# Patient Record
Sex: FEMALE | Race: WHITE | NOT HISPANIC OR LATINO
[De-identification: names, ages, dates, MRNs, and addresses within clinical notes are randomized per-mention and may not be internally consistent; named-entity substitution may affect disease eponyms.]

---

## 2021-01-03 ENCOUNTER — APPOINTMENT (OUTPATIENT)
Dept: DISASTER EMERGENCY | Facility: CLINIC | Age: 26
End: 2021-01-03

## 2021-01-03 DIAGNOSIS — Z01.818 ENCOUNTER FOR OTHER PREPROCEDURAL EXAMINATION: ICD-10-CM

## 2021-01-05 LAB — SARS-COV-2 N GENE NPH QL NAA+PROBE: NOT DETECTED

## 2021-01-05 RX ORDER — ONDANSETRON 8 MG/1
4 TABLET, FILM COATED ORAL ONCE
Refills: 0 | Status: DISCONTINUED | OUTPATIENT
Start: 2021-01-06 | End: 2021-01-07

## 2021-01-05 RX ORDER — MEPERIDINE HYDROCHLORIDE 50 MG/ML
12.5 INJECTION INTRAMUSCULAR; INTRAVENOUS; SUBCUTANEOUS
Refills: 0 | Status: DISCONTINUED | OUTPATIENT
Start: 2021-01-06 | End: 2021-01-07

## 2021-01-05 RX ORDER — FENTANYL CITRATE 50 UG/ML
50 INJECTION INTRAVENOUS
Refills: 0 | Status: DISCONTINUED | OUTPATIENT
Start: 2021-01-06 | End: 2021-01-07

## 2021-01-05 RX ORDER — SODIUM CHLORIDE 9 MG/ML
1000 INJECTION, SOLUTION INTRAVENOUS
Refills: 0 | Status: DISCONTINUED | OUTPATIENT
Start: 2021-01-06 | End: 2021-01-07

## 2021-01-05 RX ORDER — FAMOTIDINE 10 MG/ML
20 INJECTION INTRAVENOUS ONCE
Refills: 0 | Status: DISCONTINUED | OUTPATIENT
Start: 2021-01-06 | End: 2021-01-07

## 2021-01-05 RX ORDER — HYDROMORPHONE HYDROCHLORIDE 2 MG/ML
0.5 INJECTION INTRAMUSCULAR; INTRAVENOUS; SUBCUTANEOUS
Refills: 0 | Status: DISCONTINUED | OUTPATIENT
Start: 2021-01-06 | End: 2021-01-07

## 2021-01-05 RX ORDER — SODIUM CHLORIDE 9 MG/ML
3 INJECTION INTRAMUSCULAR; INTRAVENOUS; SUBCUTANEOUS EVERY 8 HOURS
Refills: 0 | Status: DISCONTINUED | OUTPATIENT
Start: 2021-01-06 | End: 2021-01-07

## 2021-01-06 ENCOUNTER — OUTPATIENT (OUTPATIENT)
Dept: INPATIENT UNIT | Facility: HOSPITAL | Age: 26
LOS: 1 days | Discharge: HOME | End: 2021-01-06
Payer: COMMERCIAL

## 2021-01-06 ENCOUNTER — RESULT REVIEW (OUTPATIENT)
Age: 26
End: 2021-01-06

## 2021-01-06 VITALS
RESPIRATION RATE: 16 BRPM | OXYGEN SATURATION: 100 % | DIASTOLIC BLOOD PRESSURE: 90 MMHG | SYSTOLIC BLOOD PRESSURE: 137 MMHG | WEIGHT: 143.96 LBS | HEIGHT: 64 IN | HEART RATE: 100 BPM | TEMPERATURE: 98 F

## 2021-01-06 VITALS
DIASTOLIC BLOOD PRESSURE: 79 MMHG | RESPIRATION RATE: 16 BRPM | TEMPERATURE: 98 F | SYSTOLIC BLOOD PRESSURE: 131 MMHG | OXYGEN SATURATION: 100 % | HEART RATE: 88 BPM

## 2021-01-06 DIAGNOSIS — Z98.82 BREAST IMPLANT STATUS: Chronic | ICD-10-CM

## 2021-01-06 DIAGNOSIS — J35.01 CHRONIC TONSILLITIS: ICD-10-CM

## 2021-01-06 LAB — HCG UR QL: NEGATIVE — SIGNIFICANT CHANGE UP

## 2021-01-06 PROCEDURE — 88304 TISSUE EXAM BY PATHOLOGIST: CPT | Mod: 26

## 2021-01-06 PROCEDURE — 88304 TISSUE EXAM BY PATHOLOGIST: CPT

## 2021-01-06 PROCEDURE — 81025 URINE PREGNANCY TEST: CPT

## 2021-01-06 RX ORDER — OXYCODONE HYDROCHLORIDE 5 MG/1
10 TABLET ORAL ONCE
Refills: 0 | Status: DISCONTINUED | OUTPATIENT
Start: 2021-01-06 | End: 2021-01-06

## 2021-01-06 RX ORDER — NORETHINDRONE AND ETHINYL ESTRADIOL 0.4-0.035
1 KIT ORAL
Qty: 0 | Refills: 0 | DISCHARGE

## 2021-01-06 RX ADMIN — OXYCODONE HYDROCHLORIDE 10 MILLIGRAM(S): 5 TABLET ORAL at 12:11

## 2021-01-06 RX ADMIN — FENTANYL CITRATE 50 MICROGRAM(S): 50 INJECTION INTRAVENOUS at 11:58

## 2021-01-06 NOTE — ASU DISCHARGE PLAN (ADULT/PEDIATRIC) - CARE PROVIDER_API CALL
Zen Gill)  Otolaryngology  [address] [place] [address]  Phone: [phone]  Fax: [phone]  Follow Up Time:

## 2021-01-06 NOTE — ASU DISCHARGE PLAN (ADULT/PEDIATRIC) - NURSING INSTRUCTIONS
see other sheet; follow up 2 weeks see other sheet; follow up 2 weeks Begin with liquids and light food ( tea, toast, Jello, soups). Advance to what you normally eat. Liquids should taken in adequate amounts today.     CALL the DOCTOR:    -Fever greater than  101F  - Signs  of infection such as : increase pain,swelling,redness,or a bad  smell coming from the wound.  -Excessive amount of bleeding.  - Any pain that appears to be getting worse.  - Vomiting  -  If you have  not urinated 8 hours after surgery or have any difficulty urinating.     A responsible adult should be with you for the rest of the day and night for your safety and to help you if you needed.    Review attached FACT SHEET if applicable.

## 2021-01-11 DIAGNOSIS — Z98.82 BREAST IMPLANT STATUS: ICD-10-CM

## 2021-01-11 DIAGNOSIS — J32.9 CHRONIC SINUSITIS, UNSPECIFIED: ICD-10-CM

## 2021-01-11 DIAGNOSIS — J35.01 CHRONIC TONSILLITIS: ICD-10-CM

## 2021-01-29 PROBLEM — J32.9 CHRONIC SINUSITIS, UNSPECIFIED: Chronic | Status: ACTIVE | Noted: 2021-01-05

## 2021-02-02 ENCOUNTER — APPOINTMENT (OUTPATIENT)
Dept: CARDIOLOGY | Facility: CLINIC | Age: 26
End: 2021-02-02
Payer: COMMERCIAL

## 2021-02-02 ENCOUNTER — NON-APPOINTMENT (OUTPATIENT)
Age: 26
End: 2021-02-02

## 2021-02-02 VITALS
DIASTOLIC BLOOD PRESSURE: 90 MMHG | TEMPERATURE: 98 F | WEIGHT: 144 LBS | SYSTOLIC BLOOD PRESSURE: 158 MMHG | OXYGEN SATURATION: 100 % | HEART RATE: 125 BPM

## 2021-02-02 PROCEDURE — 99072 ADDL SUPL MATRL&STAF TM PHE: CPT

## 2021-02-02 PROCEDURE — 99204 OFFICE O/P NEW MOD 45 MIN: CPT

## 2021-02-02 PROCEDURE — 93000 ELECTROCARDIOGRAM COMPLETE: CPT

## 2021-02-02 PROCEDURE — 93242 EXT ECG>48HR<7D RECORDING: CPT

## 2021-02-02 PROCEDURE — 93306 TTE W/DOPPLER COMPLETE: CPT

## 2021-02-02 NOTE — ASSESSMENT
[FreeTextEntry1] : Flores Melara has a history of a sinus tachycardia palpitations fast heart rate which exacerbates during periods of anxiety.\par \par Physical examination is unremarkable but EKG is somewhat abnormal but with nonspecific ST-T wave changes and a sinus tachycardia\par \par Her blood pressure for age 25 was on the higher side but there is no orthostatic hypotension\par \par Overall I doubt that there is significant organic heart disease and the palpitations and sinus tachycardia could be related to increased adrenergic drive.

## 2021-02-02 NOTE — HISTORY OF PRESENT ILLNESS
[FreeTextEntry1] : Flores is a 25-year-old  overall in good health.  She has no history of diabetes hypertension non-smoker.  She denies excessive alcohol or caffeine.\par \par She has a strong family history of coronary disease prematurely in her father and grandfather\par \par She has had no significant hospitalizations and is recently status post tonsillectomy which he tolerated well without difficulty.  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  She feels her heart racing.  She is noted to heartbeat up to 160 beats a minute.\par \par She occasionally feels hot in the shower and feels lightheaded and occasionally feels lightheaded when she changes position quickly.  She has never had syncope.  She denies palpitations.  She has no edema orthopnea PND\par \par The symptoms have been going on for a year but have worsened recently particularly with the stress as a  on a beating on the street in [facility]\par \par EKG today reveals normal sinus rhythm normal NM and QRS interval normal axis with nonspecific ST-T wave changes precordially and inferiorly.

## 2021-02-02 NOTE — DISCUSSION/SUMMARY
[FreeTextEntry1] : Patient should have a cardiac work-up to include 2D echo which will be performed today and a Holter monitor for an extended period of time\par \par If these proved to be unremarkable as I suspect they will, she might require an endocrine work-up to rule out pheochromocytoma although I think this is unlikely.

## 2021-02-02 NOTE — REASON FOR VISIT
[FreeTextEntry1] : Flores Melara 25 years old  referred for evaluation of palpitations heart pounding fast heartbeat

## 2021-02-02 NOTE — PHYSICAL EXAM
[Well Groomed] : well groomed [General Appearance - Well Nourished] : well nourished [General Appearance - In No Acute Distress] : no acute distress [Normal Conjunctiva] : the conjunctiva exhibited no abnormalities [No Jugular Venous Arita A Waves] : no jugular venous arita A waves [Heart Sounds] : normal S1 and S2 [Murmurs] : no murmurs present [Respiration, Rhythm And Depth] : normal respiratory rhythm and effort [Auscultation Breath Sounds / Voice Sounds] : lungs were clear to auscultation bilaterally [Abdomen Soft] : soft [Abdomen Tenderness] : non-tender [Cyanosis, Localized] : no localized cyanosis [FreeTextEntry1] : Appears somewhat anxious but normal affect normal mood

## 2021-03-19 ENCOUNTER — NON-APPOINTMENT (OUTPATIENT)
Age: 26
End: 2021-03-19

## 2021-03-19 ENCOUNTER — APPOINTMENT (OUTPATIENT)
Dept: CARDIOLOGY | Facility: CLINIC | Age: 26
End: 2021-03-19
Payer: COMMERCIAL

## 2021-03-19 VITALS
HEIGHT: 63 IN | SYSTOLIC BLOOD PRESSURE: 155 MMHG | WEIGHT: 152 LBS | OXYGEN SATURATION: 100 % | BODY MASS INDEX: 26.93 KG/M2 | DIASTOLIC BLOOD PRESSURE: 88 MMHG | HEART RATE: 116 BPM

## 2021-03-19 DIAGNOSIS — R00.2 PALPITATIONS: ICD-10-CM

## 2021-03-19 DIAGNOSIS — R00.0 TACHYCARDIA, UNSPECIFIED: ICD-10-CM

## 2021-03-19 PROCEDURE — 99072 ADDL SUPL MATRL&STAF TM PHE: CPT

## 2021-03-19 PROCEDURE — 99213 OFFICE O/P EST LOW 20 MIN: CPT

## 2021-03-19 PROCEDURE — 93000 ELECTROCARDIOGRAM COMPLETE: CPT

## 2021-03-19 NOTE — DISCUSSION/SUMMARY
[FreeTextEntry1] : Reassured her that her cardiac status was stable.  She should have careful follow-up of her blood pressure.  I believe she can return back to work.\par \par Should the symptoms be very bothersome to her, I did suggest that perhaps low-dose beta-blocker would improve the symptoms.  She is reluctant about taking any medication\par \par Patient should follow with me again in 3 months to recheck her blood pressure and reevaluate her tachycardia

## 2021-03-19 NOTE — HISTORY OF PRESENT ILLNESS
[FreeTextEntry1] : Flores is a 25-year-old  overall in good health.  She has no history of diabetes hypertension non-smoker.  She denies excessive alcohol or caffeine.\par \par She has a strong family history of coronary disease prematurely in her father and grandfather\par \par She has had no significant hospitalizations and is recently status post tonsillectomy which he tolerated well without difficulty.  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  She feels her heart racing.  She is noted to heartbeat up to 160 beats a minute.\par \par She occasionally feels hot in the shower and feels lightheaded and occasionally feels lightheaded when she changes position quickly.  She has never had syncope.  She denies palpitations.  She has no edema orthopnea PND\par \par The symptoms have been going on for a year but have worsened recently particularly with the stress as a  on a beating on the street in [facility]\par \par EKG on last visit revealed normal sinus rhythm normal SD and QRS interval normal axis with nonspecific ST-T wave changes precordially and inferiorly.\par \par EKG performed today normal sinus rhythm within normal limits\par \par Invasive cardiac work-up included:\par 2D echo normal LV and RV function no valvular disease no pulmonary hypertension\par \par 14-day Holter monitor normal sinus rhythm, sinus tachycardia highest heart rate up to 134 with symptoms of palpitations.  There was no significant arrhythmia occasional APCs.  There were no runs of atrial fibrillation or prolonged SVT.\par \par The patient continues with her palpitations and is quite anxious and anxious about returning to work as she finds it quite stressful.

## 2021-03-19 NOTE — ASSESSMENT
[FreeTextEntry1] : Flores Melara has a history of a sinus tachycardia palpitations fast heart rate which exacerbates during periods of anxiety.\par \par Physical examination is unremarkable but EKG i today is normal\par \par Her blood pressure for age 25 was on the higher side but there is no orthostatic hypotension\par \par Noninvasive cardiac work-up was unremarkable with normal echo and normal Holter monitor except for sinus tachycardia\par \par Overall I doubt that there is significant organic heart disease and the palpitations and sinus tachycardia could be related to increased adrenergic drive.  There is no evidence of any arrhythmia\par \par The etiology of her elevated blood pressure at times and the sinus tachycardia are probably related to anxiety.  The possibility of a pheochromocytoma could be considered but is highly unlikely.\par

## 2021-03-19 NOTE — PHYSICAL EXAM
[Well Groomed] : well groomed [General Appearance - Well Nourished] : well nourished [General Appearance - In No Acute Distress] : no acute distress [Normal Conjunctiva] : the conjunctiva exhibited no abnormalities [No Jugular Venous Arita A Waves] : no jugular venous arita A waves [Respiration, Rhythm And Depth] : normal respiratory rhythm and effort [Auscultation Breath Sounds / Voice Sounds] : lungs were clear to auscultation bilaterally [Heart Sounds] : normal S1 and S2 [Murmurs] : no murmurs present [Abdomen Soft] : soft [Abdomen Tenderness] : non-tender [Cyanosis, Localized] : no localized cyanosis [FreeTextEntry1] : Appears somewhat anxious but normal affect normal mood

## 2021-03-19 NOTE — REASON FOR VISIT
[FreeTextEntry1] : Flores Melara returns for follow-up of her cardiac status palpitations and to review the results of a noninvasive cardiac work-up.  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

## 2024-12-19 ENCOUNTER — NON-APPOINTMENT (OUTPATIENT)
Age: 29
End: 2024-12-19

## 2025-08-15 ENCOUNTER — NON-APPOINTMENT (OUTPATIENT)
Age: 30
End: 2025-08-15